# Patient Record
(demographics unavailable — no encounter records)

---

## 2019-10-17 NOTE — REP
Chest x-ray:  Two views.

 

History:  Dyspnea on exertion.

 

Findings:  There is oligemia and hyperlucency in the upper lobes consistent with

emphysema.  The lung fields are well inflated.  No infiltrate is seen.  Pleural

angles are sharp.  Heart is not enlarged.  Pulmonary vasculature is not

increased.  No significant bony abnormality is seen.

 

Impression:

 

Bilateral upper lobe hyperlucency consistent with COPD.  No acute abnormality.

 

 

Electronically Signed by

Rich Gonzalez MD 10/17/2019 02:18 P